# Patient Record
Sex: FEMALE | Race: WHITE | NOT HISPANIC OR LATINO | URBAN - METROPOLITAN AREA
[De-identification: names, ages, dates, MRNs, and addresses within clinical notes are randomized per-mention and may not be internally consistent; named-entity substitution may affect disease eponyms.]

---

## 2022-06-13 ENCOUNTER — TELEPHONE (OUTPATIENT)
Dept: PLASTIC SURGERY | Facility: CLINIC | Age: 47
End: 2022-06-13
Payer: COMMERCIAL

## 2022-06-13 NOTE — TELEPHONE ENCOUNTER
Left patient vm introducing myself as the nurse navigator for the [facility], and offered/explained the services of the program. Asked pt to call me back if they have any further questions or need assistance connecting to our gender affirming services.  Provided my call back number.

## 2022-06-14 ENCOUNTER — OFFICE VISIT (OUTPATIENT)
Dept: PLASTIC SURGERY | Facility: CLINIC | Age: 47
End: 2022-06-14
Payer: COMMERCIAL

## 2022-06-14 VITALS
HEART RATE: 72 BPM | OXYGEN SATURATION: 97 % | RESPIRATION RATE: 16 BRPM | BODY MASS INDEX: 44.41 KG/M2 | HEIGHT: 68 IN | WEIGHT: 293 LBS

## 2022-06-14 DIAGNOSIS — E66.01 CLASS 3 SEVERE OBESITY WITH BODY MASS INDEX (BMI) OF 45.0 TO 49.9 IN ADULT, UNSPECIFIED OBESITY TYPE, UNSPECIFIED WHETHER SERIOUS COMORBIDITY PRESENT (CMS/HCC): Primary | ICD-10-CM

## 2022-06-14 DIAGNOSIS — E66.813 CLASS 3 SEVERE OBESITY WITH BODY MASS INDEX (BMI) OF 45.0 TO 49.9 IN ADULT, UNSPECIFIED OBESITY TYPE, UNSPECIFIED WHETHER SERIOUS COMORBIDITY PRESENT (CMS/HCC): Primary | ICD-10-CM

## 2022-06-14 PROCEDURE — 3008F BODY MASS INDEX DOCD: CPT | Performed by: PLASTIC SURGERY

## 2022-06-14 PROCEDURE — 99202 OFFICE O/P NEW SF 15 MIN: CPT | Performed by: PLASTIC SURGERY

## 2022-06-14 RX ORDER — GLIMEPIRIDE 2 MG/1
2 TABLET ORAL 2 TIMES DAILY
COMMUNITY
Start: 2022-05-03

## 2022-06-14 RX ORDER — LIDOCAINE AND PRILOCAINE 25; 25 MG/G; MG/G
1 CREAM TOPICAL DAILY PRN
COMMUNITY
Start: 2022-02-28

## 2022-06-14 RX ORDER — METFORMIN HYDROCHLORIDE 500 MG/1
1500 TABLET ORAL DAILY
COMMUNITY

## 2022-06-14 RX ORDER — NEEDLES, DISPOSABLE 25GX5/8"
NEEDLE, DISPOSABLE MISCELLANEOUS
COMMUNITY
Start: 2022-04-25

## 2022-06-14 RX ORDER — SPIRONOLACTONE 100 MG/1
200 TABLET, FILM COATED ORAL DAILY
COMMUNITY
Start: 2021-12-22

## 2022-06-14 RX ORDER — ESTRADIOL VALERATE 20 MG/ML
20 INJECTION INTRAMUSCULAR
COMMUNITY
Start: 2022-01-17

## 2022-06-14 RX ORDER — FINASTERIDE 5 MG/1
5 TABLET, FILM COATED ORAL DAILY
COMMUNITY
Start: 2022-04-23

## 2022-06-14 RX ORDER — FINASTERIDE 1 MG/1
2.5 TABLET, FILM COATED ORAL DAILY
COMMUNITY
Start: 2022-02-01

## 2022-06-14 NOTE — PROGRESS NOTES
"PLASTIC SURGERY  -  NEW PATIENT NOTE    CC: Evaluation for gender affirming breast augmentation    HPI: Jen  is a 46 y.o. transgender woman (assigned male at birth, identifies as female) who presents to discuss bilateral breast augmentation.    There is no personal family history of breast disease.    The patient has never had a mammogram.    She is a nonsmoker.    Hormonal therapy: 4-5 years  Previous gender affirmation surgery: none  Therapist: Get Le    Medical History  Type 2 diabetes - HgA1c - 5.4%    Surgical History  Umbilical hernia repair - 2001    Medications    Current Outpatient Medications:   •  estradiol valerate (DELESTROGEN) 20 mg/mL injection, Inject 20 mg into the shoulder, thigh, or buttocks every 14 (fourteen) days., Disp: , Rfl:   •  finasteride (PROPECIA) 1 mg tablet, Take 2.5 mg by mouth daily., Disp: , Rfl:   •  glimepiride (AMARYL) 2 mg tablet, Take 2 mg by mouth 2 (two) times a day., Disp: , Rfl:   •  HYPODERMIC NEEDLES 18 gauge x 1 1/2\" needle, USE WITH ESTRADIOL., Disp: , Rfl:   •  lidocaine-prilocaine (EMLA) cream, Apply 1 application topically daily as needed., Disp: , Rfl:   •  metFORMIN (GLUCOPHAGE) 500 mg tablet, Take 1,500 mg by mouth daily., Disp: , Rfl:   •  spironolactone (ALDACTONE) 100 mg tablet, Take 200 mg by mouth daily., Disp: , Rfl:       Allergies  Allergies   Allergen Reactions   • Cephalexin Dermatitis     Significant RLE pain/swelling     • Hydrocodone-Acetaminophen      Nausea   • Ace Inhibitors      Other reaction(s): Cough       Social History  Currently is working. Works as  supervisor for [employer]  Lives with Cristy, wife  Tobacco: non-smoker    Family History  No family history of breast cancer  There is not a family history of problems with anesthesia or bleeding disorders      ROS: 10/14 systems reviewed, negative except noted above.     PHYSICAL EXAM:  Vitals:    06/14/22 1434   Pulse: 72   Resp: 16   SpO2: 97%      Body mass " index is 49.57 kg/m².     In general, the patient is well-appearing, nicely groomed, and in no acute distress. They are able to provide their own medical history.  Examination deferred at patient request     ASSESSMENT / PLAN:     Jen Wells is a 46 y.o. transgender woman who presents to discuss bilateral breast augmentation. We discussed that her BMI puts her at very high risk of complication with breast implants. Recommended weight loss and optimization of health prior to elective surgery. Referral was placed to [facility]. Patient can follow up when closer to weight goal. Discussed BMI being <40.     Photographs were taken in clinic today for surgical planning and continuity of care.     Thank you for the kind referral of this very pleasant patient. Please do not hesitate to contact me with any questions or concerns about my aspect of their care.       Patt Llanes MD

## 2023-02-06 ENCOUNTER — APPOINTMENT (OUTPATIENT)
Dept: PLASTIC SURGERY | Facility: CLINIC | Age: 48
End: 2023-02-06

## 2023-02-06 PROBLEM — Z00.00 ENCOUNTER FOR PREVENTIVE HEALTH EXAMINATION: Status: ACTIVE | Noted: 2023-02-06
